# Patient Record
Sex: MALE | Race: ASIAN | NOT HISPANIC OR LATINO | URBAN - METROPOLITAN AREA
[De-identification: names, ages, dates, MRNs, and addresses within clinical notes are randomized per-mention and may not be internally consistent; named-entity substitution may affect disease eponyms.]

---

## 2017-08-25 ENCOUNTER — EMERGENCY (EMERGENCY)
Facility: HOSPITAL | Age: 22
LOS: 1 days | Discharge: HOME | End: 2017-08-25
Attending: EMERGENCY MEDICINE | Admitting: EMERGENCY MEDICINE
Payer: MEDICAID

## 2017-08-25 VITALS
SYSTOLIC BLOOD PRESSURE: 117 MMHG | RESPIRATION RATE: 18 BRPM | HEART RATE: 66 BPM | TEMPERATURE: 98 F | DIASTOLIC BLOOD PRESSURE: 66 MMHG | OXYGEN SATURATION: 99 %

## 2017-08-25 PROCEDURE — 73562 X-RAY EXAM OF KNEE 3: CPT | Mod: 26,LT

## 2017-08-25 PROCEDURE — 73030 X-RAY EXAM OF SHOULDER: CPT | Mod: 26,LT

## 2017-08-25 PROCEDURE — 73100 X-RAY EXAM OF WRIST: CPT | Mod: 26,LT

## 2017-08-25 PROCEDURE — 99284 EMERGENCY DEPT VISIT MOD MDM: CPT

## 2017-08-25 PROCEDURE — 73120 X-RAY EXAM OF HAND: CPT | Mod: 26,LT

## 2017-08-25 RX ORDER — TETANUS TOXOID, REDUCED DIPHTHERIA TOXOID AND ACELLULAR PERTUSSIS VACCINE, ADSORBED 5; 2.5; 8; 8; 2.5 [IU]/.5ML; [IU]/.5ML; UG/.5ML; UG/.5ML; UG/.5ML
0.5 SUSPENSION INTRAMUSCULAR ONCE
Qty: 0 | Refills: 0 | Status: COMPLETED | OUTPATIENT
Start: 2017-08-25 | End: 2017-08-25

## 2017-08-25 RX ORDER — ACETAMINOPHEN 500 MG
975 TABLET ORAL ONCE
Qty: 0 | Refills: 0 | Status: COMPLETED | OUTPATIENT
Start: 2017-08-25 | End: 2017-08-25

## 2017-08-25 RX ADMIN — TETANUS TOXOID, REDUCED DIPHTHERIA TOXOID AND ACELLULAR PERTUSSIS VACCINE, ADSORBED 0.5 MILLILITER(S): 5; 2.5; 8; 8; 2.5 SUSPENSION INTRAMUSCULAR at 23:09

## 2017-08-25 RX ADMIN — Medication 975 MILLIGRAM(S): at 23:08

## 2017-08-25 NOTE — ED PROVIDER NOTE - PROGRESS NOTE DETAILS
pt re-assessed. feels improved. updated on xray results. wishes to be d/c'd home. discussed proper wound care, follow-up, and indications to return to ED. stable for d/c home. - Otoniel Pompa MD

## 2017-08-25 NOTE — ED PROVIDER NOTE - PLAN OF CARE
1. Take Tylenol as directed for pain.   2. Take motrin, 600mg every 6 hours as needed for pain.   3. Perform wound care as directed  4. Follow-up with your primary care doctor or medicine clinic in 3-5 days   5. Return immediately for any worsening or concerning symptoms
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
## 2017-08-25 NOTE — ED PROVIDER NOTE - PHYSICAL EXAMINATION
CN II-XII intact. Visual fields intact. EOMI, PERRLA. Facial sensation equal bilat. Smile and eye closure equal bilat. Hearing intact bilat. Palate elevation equal, tongue protrusion midline. Lateral head rotation equal bilat. 5/5 strength UE and LE bilat. No pronator drift. Normal gait.
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
## 2017-08-25 NOTE — ED PROVIDER NOTE - CARE PLAN
Principal Discharge DX:	Abrasions of multiple sites Principal Discharge DX:	Abrasions of multiple sites  Instructions for follow-up, activity and diet:	1. Take Tylenol as directed for pain.   2. Take motrin, 600mg every 6 hours as needed for pain.   3. Perform wound care as directed  4. Follow-up with your primary care doctor or medicine clinic in 3-5 days   5. Return immediately for any worsening or concerning symptoms
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
